# Patient Record
Sex: MALE | Race: WHITE | Employment: FULL TIME | URBAN - METROPOLITAN AREA
[De-identification: names, ages, dates, MRNs, and addresses within clinical notes are randomized per-mention and may not be internally consistent; named-entity substitution may affect disease eponyms.]

---

## 2020-11-19 ENCOUNTER — APPOINTMENT (OUTPATIENT)
Dept: GENERAL RADIOLOGY | Age: 30
End: 2020-11-19

## 2020-11-19 ENCOUNTER — HOSPITAL ENCOUNTER (EMERGENCY)
Age: 30
Discharge: HOME | End: 2020-11-19

## 2020-11-19 VITALS
HEIGHT: 71 IN | BODY MASS INDEX: 27.3 KG/M2 | DIASTOLIC BLOOD PRESSURE: 82 MMHG | OXYGEN SATURATION: 97 % | TEMPERATURE: 97.8 F | RESPIRATION RATE: 18 BRPM | HEART RATE: 82 BPM | WEIGHT: 195 LBS | SYSTOLIC BLOOD PRESSURE: 129 MMHG

## 2020-11-19 LAB
ALBUMIN SERPL-MCNC: 4.2 GM/DL (ref 3.4–5)
ALP BLD-CCNC: 71 IU/L (ref 40–129)
ALT SERPL-CCNC: 30 U/L (ref 10–40)
ANION GAP SERPL CALCULATED.3IONS-SCNC: 11 MMOL/L (ref 4–16)
AST SERPL-CCNC: 33 IU/L (ref 15–37)
BASOPHILS ABSOLUTE: 0.1 K/CU MM
BASOPHILS RELATIVE PERCENT: 0.7 % (ref 0–1)
BILIRUB SERPL-MCNC: 0.6 MG/DL (ref 0–1)
BUN BLDV-MCNC: 12 MG/DL (ref 6–23)
CALCIUM SERPL-MCNC: 8.9 MG/DL (ref 8.3–10.6)
CHLORIDE BLD-SCNC: 104 MMOL/L (ref 99–110)
CO2: 24 MMOL/L (ref 21–32)
CREAT SERPL-MCNC: 1.1 MG/DL (ref 0.9–1.3)
DIFFERENTIAL TYPE: ABNORMAL
EOSINOPHILS ABSOLUTE: 0.3 K/CU MM
EOSINOPHILS RELATIVE PERCENT: 2.9 % (ref 0–3)
GFR AFRICAN AMERICAN: >60 ML/MIN/1.73M2
GFR NON-AFRICAN AMERICAN: >60 ML/MIN/1.73M2
GLUCOSE BLD-MCNC: 78 MG/DL (ref 70–99)
HCT VFR BLD CALC: 45.3 % (ref 42–52)
HEMOGLOBIN: 15.1 GM/DL (ref 13.5–18)
IMMATURE NEUTROPHIL %: 0.7 % (ref 0–0.43)
LYMPHOCYTES ABSOLUTE: 2.2 K/CU MM
LYMPHOCYTES RELATIVE PERCENT: 22.5 % (ref 24–44)
MCH RBC QN AUTO: 30.6 PG (ref 27–31)
MCHC RBC AUTO-ENTMCNC: 33.3 % (ref 32–36)
MCV RBC AUTO: 91.7 FL (ref 78–100)
MONOCYTES ABSOLUTE: 0.7 K/CU MM
MONOCYTES RELATIVE PERCENT: 7.5 % (ref 0–4)
NUCLEATED RBC %: 0 %
PDW BLD-RTO: 13.2 % (ref 11.7–14.9)
PLATELET # BLD: 250 K/CU MM (ref 140–440)
PMV BLD AUTO: 9.5 FL (ref 7.5–11.1)
POTASSIUM SERPL-SCNC: 4.1 MMOL/L (ref 3.5–5.1)
RBC # BLD: 4.94 M/CU MM (ref 4.6–6.2)
SEGMENTED NEUTROPHILS ABSOLUTE COUNT: 6.3 K/CU MM
SEGMENTED NEUTROPHILS RELATIVE PERCENT: 65.7 % (ref 36–66)
SODIUM BLD-SCNC: 139 MMOL/L (ref 135–145)
TOTAL IMMATURE NEUTOROPHIL: 0.07 K/CU MM
TOTAL NUCLEATED RBC: 0 K/CU MM
TOTAL PROTEIN: 6.7 GM/DL (ref 6.4–8.2)
TROPONIN T: <0.01 NG/ML
TSH HIGH SENSITIVITY: 323.2 UIU/ML (ref 0.27–4.2)
WBC # BLD: 9.6 K/CU MM (ref 4–10.5)

## 2020-11-19 PROCEDURE — 93005 ELECTROCARDIOGRAM TRACING: CPT | Performed by: PHYSICIAN ASSISTANT

## 2020-11-19 PROCEDURE — 99284 EMERGENCY DEPT VISIT MOD MDM: CPT

## 2020-11-19 PROCEDURE — 85025 COMPLETE CBC W/AUTO DIFF WBC: CPT

## 2020-11-19 PROCEDURE — 71045 X-RAY EXAM CHEST 1 VIEW: CPT

## 2020-11-19 PROCEDURE — U0002 COVID-19 LAB TEST NON-CDC: HCPCS

## 2020-11-19 PROCEDURE — 84484 ASSAY OF TROPONIN QUANT: CPT

## 2020-11-19 PROCEDURE — 84443 ASSAY THYROID STIM HORMONE: CPT

## 2020-11-19 PROCEDURE — 93010 ELECTROCARDIOGRAM REPORT: CPT | Performed by: INTERNAL MEDICINE

## 2020-11-19 PROCEDURE — 80053 COMPREHEN METABOLIC PANEL: CPT

## 2020-11-19 RX ORDER — LEVOTHYROXINE SODIUM 0.07 MG/1
75 TABLET ORAL DAILY
Qty: 90 TABLET | Refills: 0 | Status: SHIPPED | OUTPATIENT
Start: 2020-11-19

## 2020-11-19 ASSESSMENT — PAIN SCALES - GENERAL: PAINLEVEL_OUTOF10: 7

## 2020-11-19 NOTE — ED PROVIDER NOTES
Twelve-lead EKG interpreted by me in the absence of a cardiologist.  There is no criteria ST elevation or reciprocal change. There are no hyperacute T wave changes. There is no sign of acute ischemia or infarction. There is an RSR' pattern in V1 and V2 and an S wave in lead I consistent with an incomplete right bundle branch block. There is appropriate discordance. This tracing shows a normal sinus rhythm. Rate and intervals are 69 beats per minute, WA interval 192 milliseconds, QRS duration 104 milliseconds, QTc interval 422 milliseconds, and R axis normal at -1 degrees. There is no acute change compared with the most recent EKG dated April 11, 2013 with a more pronounced bundle pattern today.         Maile Bagley MD  11/19/20 0983

## 2020-11-19 NOTE — ED PROVIDER NOTES
Patient Identification  Laina Ortiz is a 31 y.o. male    Chief Complaint  Concern For COVID-19; Fatigue; Fever; and Diarrhea      HPI  (History provided by patient)  This is a 31 y.o. male who was brought in by self for chief complaint of fatigue, subjective fever, diarrhea, chest pain. Patient notes that he has been having increasing fatigue for the last 3 weeks. No clear source. Has been sleeping well. No sick contacts. States over the last 12 hours he has developed pain in his left anterior chest intermittently, general body aches, worsening fatigue, several episodes of nonbloody diarrhea, subjective fever, occasional cough. He is concerned he may have Covid. He does not follow with any primary care provider. No known history of hypertension, hyperlipidemia, diabetes, coronary artery disease. No family history of early onset coronary artery disease. Denies drug use. Current smoker. REVIEW OF SYSTEMS    Constitutional:  + subjective fever, chills  HENT:  Denies ear pain.  + ST  Eyes: Denies vision changes, eye pain  Cardiovascular:  Denies syncope.  + CP  Respiratory:  Denies shortness of breath. + cough   GI:  Denies abdominal pain, nausea, vomiting  :  Denies dysuria, discharge  Musculoskeletal:  Denies back pain, joint pain  Skin:  Denies rash, pruritis  Neurologic:  Denies focal weakness, or sensory changes. + GILLESPIE    See HPI and nursing notes for additional information     I have reviewed the following nursing documentation:  Allergies: No Known Allergies    Past medical history:  has a past medical history of Spina bifida ([facility] [address] Past surgical history:  has no past surgical history on file. Home medications:   Prior to Admission medications    Medication Sig Start Date End Date Taking?  Authorizing Provider   levothyroxine (SYNTHROID) 75 MCG tablet Take 1 tablet by mouth daily 11/19/20  Yes Yoni Roach PA-C   ibuprofen (IBU) 800 MG tablet Take 1 tablet by mouth every 6 hours as needed for Pain 5/29/18   Cookie Mccormick PA-C   naproxen (NAPROSYN) 500 MG tablet Take 1 tablet by mouth 2 times daily 4/23/17   Camille Weiner PA-C   lidocaine viscous (XYLOCAINE) 2 % solution Put viscous lidocaine in specimen cup and soak cotton balls. Patient to apply cotton ball to affected region every 3-4  hours as needed when necessary pain 4/23/17   David Dozier PA-C       Social history:  reports that he has been smoking cigarettes. He has been smoking about 0.50 packs per day. He does not have any smokeless tobacco history on file. He reports current alcohol use. He reports that he does not use drugs. Family history:  History reviewed. No pertinent family history. Exam  /82   Pulse 82   Temp 97.8 °F (36.6 °C) (Oral)   Resp 18   Ht 5' 11\" (1.803 m)   Wt 195 lb (88.5 kg)   SpO2 97%   BMI 27.20 kg/m²   Nursing note and vitals reviewed. Constitutional: Well developed, well nourished. No acute distress. HENT:      Head: Normocephalic and atraumatic. Ears: External ears normal.      Nose: Nose normal.     Mouth: Membrane mucosa moist and pink. No posterior oropharynx erythema or tonsillar edema  Eyes: Anicteric sclera. No discharge, PERRL  Neck: Supple. Trachea midline. Cardiovascular: RRR, no murmurs, rubs, or gallops, radial pulses 2+ bilaterally. Pulmonary/Chest: Effort normal. No respiratory distress. CTAB. No stridor. No wheezes. No rales. Abdominal: Soft. Nontender to palpation. No distension. No guarding, rebound tenderness, or evidence of ascites. : No CVA tenderness. Musculoskeletal: Moves all extremities. No gross deformity. Neurological: Alert and oriented to person, place, and time. Normal muscle tone. Skin: Warm and dry. No rash. Psychiatric: Normal mood and affect. Behavior is normal.      EKG   Please see Dr. Cristiano Siu note for EKG read.       Radiographs (if obtained):  [] The following radiograph was interpreted by P-R Interval 192 ms    QRS Duration 104 ms    Q-T Interval 394 ms    QTc Calculation (Bazett) 422 ms    P Axis 55 degrees    R Axis -1 degrees    T Axis 23 degrees    Diagnosis       Normal sinus rhythm  Incomplete right bundle branch block  Borderline ECG  No previous ECGs available           MDM  Patient presents for fatigue, subjective fevers, diarrhea, concern for Covid. Vital signs unremarkable here. Physical exam is benign. He did report some chest discomfort in addition to his other symptoms, chest x-ray is negative, EKG is unremarkable, laboratory testing concerning for extremely elevated TSH. Patient does note that he was diagnosed with hypothyroidism as a child and was on medication through his pediatrician but has not seen any medical provider since that time, states he \"sort of forgot\" that he had hypothyroidism. His mother confirmed that his previous dose of 75 mcg of levothyroxine. Plan is to restart him on this medication and referred to primary care on call. Discussed with patient he needs to stay home until Covid test is returned. I did don/doff appropriate PPE, as recommended by the health facility/national standard best practice, during my bedside interactions with the patient. The patients risk of COVID19 warrants testing, but the patents risk of a bad outcome at this time is lower at home than staying in the ED or hospital to await the results. I have placed the placed the patient into a tracking system with contact information. I have given the patient precautions for return, as well as education and resources on COVID19. I have independently evaluated this patient. Final Impression  1. Other fatigue    2. Hypothyroidism, unspecified type        Blood pressure 129/82, pulse 82, temperature 97.8 °F (36.6 °C), temperature source Oral, resp. rate 18, height 5' 11\" (1.803 m), weight 195 lb (88.5 kg), SpO2 97 %. Disposition:  Discharge to home in stable condition. Patient was given scripts for the following medications. I counseled patient how to take these medications. Discharge Medication List as of 11/19/2020  9:46 AM      START taking these medications    Details   levothyroxine (SYNTHROID) 75 MCG tablet Take 1 tablet by mouth daily, Disp-90 tablet,R-0Print             This chart was generated using the Dragon dictation system. I created this record but it may contain dictation errors given the limitations of this technology.        Darshana Wolfe PA-C  11/19/20 5220

## 2020-11-19 NOTE — ED NOTES
Pt presents to ED c/o fatigue, diarrhea, and a fever for the past week. States he called into work and they want him to get tested for covid. Pt is alert and oriented, rates pain 7/10.      Batsheva Diego RN  11/19/20 0040

## 2020-11-20 ENCOUNTER — CARE COORDINATION (OUTPATIENT)
Dept: CARE COORDINATION | Age: 30
End: 2020-11-20

## 2020-11-20 LAB
SARS-COV-2: NOT DETECTED
SOURCE: NORMAL

## 2020-11-20 NOTE — CARE COORDINATION
Patient contacted regarding [facility]. Discussed COVID-19 related testing which was pending at this time. Test results were pending. Patient informed of results, if available? No    Care Transition Nurse/ Ambulatory Care Manager contacted the patient by telephone to perform post discharge assessment. Call within 2 business days of discharge: Yes. Verified name and  with patient as identifiers. Provided introduction to self, and explanation of the CTN/ACM role, and reason for call due to risk factors for infection and/or exposure to COVID-19. Symptoms reviewed with patient who verbalized the following symptoms: no new symptoms and no worsening symptoms. Due to no new or worsening symptoms encounter was not routed to provider for escalation. Discussed follow-up appointments. If no appointment was previously scheduled, appointment scheduling offered: Yes  [facility] follow up appointment(s): No future appointments. Non-[facility] follow up appointment(s):     Non-face-to-face services provided:  Obtained and reviewed discharge summary and/or continuity of care documents  Assistance in accessing community resources-[facility] in care clinic info     Advance Care Planning:   Does patient have an Advance Directive:  decision maker updated. Patient has following risk factors of: no known risk factors. CTN/ACM reviewed discharge instructions, medical action plan and red flags such as increased shortness of breath, increasing fever and signs of decompensation with patient who verbalized understanding. Discussed exposure protocols and quarantine with CDC Guidelines What to do if you are sick with coronavirus disease .  Patient was given an opportunity for questions and concerns. The patient agrees to contact the [facility] exposure line [phone], local health department  and PCP office for questions related to their healthcare. CTN/ACM provided contact information for future needs.     Reviewed and educated patient on any new and changed medications related to discharge diagnosis     Patient/family/caregiver given information for GetWell Loop and agrees to enroll yes  Patient's preferred e-mail:    Patient's preferred phone number:   Based on Loop alert triggers, patient will be contacted by nurse care manager for worsening symptoms. Pt will be further monitored by COVID Loop Team based on severity of symptoms and risk factors. [email or web address]

## 2020-11-24 ENCOUNTER — CARE COORDINATION (OUTPATIENT)
Dept: CARE COORDINATION | Age: 30
End: 2020-11-24

## 2020-11-24 LAB
EKG ATRIAL RATE: 69 BPM
EKG DIAGNOSIS: NORMAL
EKG P AXIS: 55 DEGREES
EKG P-R INTERVAL: 192 MS
EKG Q-T INTERVAL: 394 MS
EKG QRS DURATION: 104 MS
EKG QTC CALCULATION (BAZETT): 422 MS
EKG R AXIS: -1 DEGREES
EKG T AXIS: 23 DEGREES
EKG VENTRICULAR RATE: 69 BPM

## 2021-08-05 PROCEDURE — 99283 EMERGENCY DEPT VISIT LOW MDM: CPT

## 2021-08-05 ASSESSMENT — PAIN DESCRIPTION - LOCATION: LOCATION: NECK

## 2021-08-05 ASSESSMENT — PAIN SCALES - GENERAL: PAINLEVEL_OUTOF10: 6

## 2021-08-06 ENCOUNTER — HOSPITAL ENCOUNTER (INPATIENT)
Age: 31
LOS: 1 days | Discharge: LEFT BEFORE DISCHARGE | DRG: 103 | End: 2021-08-06
Attending: STUDENT IN AN ORGANIZED HEALTH CARE EDUCATION/TRAINING PROGRAM | Admitting: STUDENT IN AN ORGANIZED HEALTH CARE EDUCATION/TRAINING PROGRAM

## 2021-08-06 ENCOUNTER — APPOINTMENT (OUTPATIENT)
Dept: CT IMAGING | Age: 31
DRG: 103 | End: 2021-08-06

## 2021-08-06 ENCOUNTER — APPOINTMENT (OUTPATIENT)
Dept: GENERAL RADIOLOGY | Age: 31
DRG: 103 | End: 2021-08-06

## 2021-08-06 VITALS
BODY MASS INDEX: 27.72 KG/M2 | WEIGHT: 198 LBS | SYSTOLIC BLOOD PRESSURE: 112 MMHG | DIASTOLIC BLOOD PRESSURE: 68 MMHG | TEMPERATURE: 97.4 F | HEART RATE: 80 BPM | RESPIRATION RATE: 18 BRPM | HEIGHT: 71 IN | OXYGEN SATURATION: 97 %

## 2021-08-06 DIAGNOSIS — R29.1 NUCHAL RIGIDITY: ICD-10-CM

## 2021-08-06 DIAGNOSIS — N34.2 URETHRITIS: ICD-10-CM

## 2021-08-06 DIAGNOSIS — M25.50 POLYARTHRALGIA: Primary | ICD-10-CM

## 2021-08-06 PROBLEM — R51.9 HEADACHE: Status: ACTIVE | Noted: 2021-08-06

## 2021-08-06 LAB
ALBUMIN SERPL-MCNC: 4.3 GM/DL (ref 3.4–5)
ALP BLD-CCNC: 90 IU/L (ref 40–128)
ALT SERPL-CCNC: 43 U/L (ref 10–40)
ANION GAP SERPL CALCULATED.3IONS-SCNC: 10 MMOL/L (ref 4–16)
AST SERPL-CCNC: 46 IU/L (ref 15–37)
BACTERIA: NEGATIVE /HPF
BASE EXCESS MIXED: 3.2 (ref 0–1.2)
BASOPHILS ABSOLUTE: 0.1 K/CU MM
BASOPHILS RELATIVE PERCENT: 0.8 % (ref 0–1)
BILIRUB SERPL-MCNC: 0.8 MG/DL (ref 0–1)
BILIRUBIN URINE: NEGATIVE MG/DL
BLOOD, URINE: NEGATIVE
BUN BLDV-MCNC: 10 MG/DL (ref 6–23)
CALCIUM SERPL-MCNC: 9 MG/DL (ref 8.3–10.6)
CHLORIDE BLD-SCNC: 102 MMOL/L (ref 99–110)
CLARITY: CLEAR
CO2: 26 MMOL/L (ref 21–32)
COLOR: YELLOW
COMMENT: ABNORMAL
CREAT SERPL-MCNC: 1.2 MG/DL (ref 0.9–1.3)
CULTURE: NORMAL
DIFFERENTIAL TYPE: ABNORMAL
EKG ATRIAL RATE: 95 BPM
EKG DIAGNOSIS: NORMAL
EKG P AXIS: 60 DEGREES
EKG P-R INTERVAL: 164 MS
EKG Q-T INTERVAL: 352 MS
EKG QRS DURATION: 102 MS
EKG QTC CALCULATION (BAZETT): 442 MS
EKG R AXIS: -24 DEGREES
EKG T AXIS: 29 DEGREES
EKG VENTRICULAR RATE: 95 BPM
EOSINOPHILS ABSOLUTE: 0.2 K/CU MM
EOSINOPHILS RELATIVE PERCENT: 2.1 % (ref 0–3)
GFR AFRICAN AMERICAN: >60 ML/MIN/1.73M2
GFR NON-AFRICAN AMERICAN: >60 ML/MIN/1.73M2
GLUCOSE BLD-MCNC: 88 MG/DL (ref 70–99)
GLUCOSE, CSF: 56 MG/DL (ref 40–75)
GLUCOSE, URINE: NEGATIVE MG/DL
HCO3 VENOUS: 26.9 MMOL/L (ref 19–25)
HCT VFR BLD CALC: 45 % (ref 42–52)
HEMOGLOBIN: 14.7 GM/DL (ref 13.5–18)
HEPATITIS C ANTIBODY: NON REACTIVE
HIV SCREEN: NON REACTIVE
IMMATURE NEUTROPHIL %: 0.4 % (ref 0–0.43)
KETONES, URINE: NEGATIVE MG/DL
LACTATE: 1.2 MMOL/L (ref 0.4–2)
LEUKOCYTE ESTERASE, URINE: ABNORMAL
LYMPHOCYTES ABSOLUTE: 1.6 K/CU MM
LYMPHOCYTES RELATIVE PERCENT: 14.3 % (ref 24–44)
Lab: NORMAL
MCH RBC QN AUTO: 29.7 PG (ref 27–31)
MCHC RBC AUTO-ENTMCNC: 32.7 % (ref 32–36)
MCV RBC AUTO: 90.9 FL (ref 78–100)
MONOCYTES ABSOLUTE: 0.8 K/CU MM
MONOCYTES RELATIVE PERCENT: 6.6 % (ref 0–4)
MUCUS: ABNORMAL HPF
NITRITE URINE, QUANTITATIVE: NEGATIVE
NUCLEATED RBC %: 0 %
O2 SAT, VEN: 90.9 % (ref 50–70)
OTHER CELLS CSF: NORMAL CU MM
OTHER CELLS CSF: NORMAL CU MM
PCO2, VEN: 37 MMHG (ref 38–52)
PDW BLD-RTO: 13.1 % (ref 11.7–14.9)
PH VENOUS: 7.47 (ref 7.32–7.42)
PH, URINE: 6 (ref 5–8)
PLATELET # BLD: 169 K/CU MM (ref 140–440)
PMV BLD AUTO: 10.1 FL (ref 7.5–11.1)
PO2, VEN: 109 MMHG (ref 28–48)
POTASSIUM SERPL-SCNC: 4 MMOL/L (ref 3.5–5.1)
PRO-BNP: 95.17 PG/ML
PROTEIN CSF: 50 MG/DL (ref 15–45)
PROTEIN UA: NEGATIVE MG/DL
RBC # BLD: 4.95 M/CU MM (ref 4.6–6.2)
RBC CSF: 0 CU MM
RBC CSF: 0 CU MM
RBC URINE: 5 /HPF (ref 0–3)
SEGMENTED NEUTROPHILS ABSOLUTE COUNT: 8.6 K/CU MM
SEGMENTED NEUTROPHILS RELATIVE PERCENT: 75.8 % (ref 36–66)
SODIUM BLD-SCNC: 138 MMOL/L (ref 135–145)
SPECIFIC GRAVITY UA: 1.02 (ref 1–1.03)
SPECIMEN: NORMAL
SQUAMOUS EPITHELIAL: <1 /HPF
T4 FREE: 0.11 NG/DL (ref 0.9–1.8)
TOTAL IMMATURE NEUTOROPHIL: 0.05 K/CU MM
TOTAL NUCLEATED RBC: 0 K/CU MM
TOTAL PROTEIN: 6.6 GM/DL (ref 6.4–8.2)
TRICHOMONAS: ABNORMAL /HPF
TROPONIN T: <0.01 NG/ML
TSH HIGH SENSITIVITY: 328.9 UIU/ML (ref 0.27–4.2)
TUBE #: NORMAL
TUBE #: NORMAL
UROBILINOGEN, URINE: 2 MG/DL (ref 0.2–1)
WBC # BLD: 11.4 K/CU MM (ref 4–10.5)
WBC CSF: 0 CU MM (ref 0–5)
WBC CSF: 0 CU MM (ref 0–5)
WBC UA: 8 /HPF (ref 0–2)

## 2021-08-06 PROCEDURE — 85025 COMPLETE CBC W/AUTO DIFF WBC: CPT

## 2021-08-06 PROCEDURE — 2580000003 HC RX 258: Performed by: STUDENT IN AN ORGANIZED HEALTH CARE EDUCATION/TRAINING PROGRAM

## 2021-08-06 PROCEDURE — 84443 ASSAY THYROID STIM HORMONE: CPT

## 2021-08-06 PROCEDURE — 81001 URINALYSIS AUTO W/SCOPE: CPT

## 2021-08-06 PROCEDURE — 84484 ASSAY OF TROPONIN QUANT: CPT

## 2021-08-06 PROCEDURE — 70450 CT HEAD/BRAIN W/O DYE: CPT

## 2021-08-06 PROCEDURE — 84439 ASSAY OF FREE THYROXINE: CPT

## 2021-08-06 PROCEDURE — 93010 ELECTROCARDIOGRAM REPORT: CPT | Performed by: INTERNAL MEDICINE

## 2021-08-06 PROCEDURE — 87040 BLOOD CULTURE FOR BACTERIA: CPT

## 2021-08-06 PROCEDURE — 1200000000 HC SEMI PRIVATE

## 2021-08-06 PROCEDURE — 82805 BLOOD GASES W/O2 SATURATION: CPT

## 2021-08-06 PROCEDURE — 89051 BODY FLUID CELL COUNT: CPT

## 2021-08-06 PROCEDURE — 6360000002 HC RX W HCPCS: Performed by: STUDENT IN AN ORGANIZED HEALTH CARE EDUCATION/TRAINING PROGRAM

## 2021-08-06 PROCEDURE — 87591 N.GONORRHOEAE DNA AMP PROB: CPT

## 2021-08-06 PROCEDURE — 86803 HEPATITIS C AB TEST: CPT

## 2021-08-06 PROCEDURE — 009U3ZX DRAINAGE OF SPINAL CANAL, PERCUTANEOUS APPROACH, DIAGNOSTIC: ICD-10-PCS | Performed by: STUDENT IN AN ORGANIZED HEALTH CARE EDUCATION/TRAINING PROGRAM

## 2021-08-06 PROCEDURE — 62270 DX LMBR SPI PNXR: CPT

## 2021-08-06 PROCEDURE — 36415 COLL VENOUS BLD VENIPUNCTURE: CPT

## 2021-08-06 PROCEDURE — 82945 GLUCOSE OTHER FLUID: CPT

## 2021-08-06 PROCEDURE — 84157 ASSAY OF PROTEIN OTHER: CPT

## 2021-08-06 PROCEDURE — 87070 CULTURE OTHR SPECIMN AEROBIC: CPT

## 2021-08-06 PROCEDURE — 87205 SMEAR GRAM STAIN: CPT

## 2021-08-06 PROCEDURE — 93005 ELECTROCARDIOGRAM TRACING: CPT | Performed by: STUDENT IN AN ORGANIZED HEALTH CARE EDUCATION/TRAINING PROGRAM

## 2021-08-06 PROCEDURE — 86592 SYPHILIS TEST NON-TREP QUAL: CPT

## 2021-08-06 PROCEDURE — 80053 COMPREHEN METABOLIC PANEL: CPT

## 2021-08-06 PROCEDURE — 83880 ASSAY OF NATRIURETIC PEPTIDE: CPT

## 2021-08-06 PROCEDURE — 83605 ASSAY OF LACTIC ACID: CPT

## 2021-08-06 PROCEDURE — 87491 CHLMYD TRACH DNA AMP PROBE: CPT

## 2021-08-06 PROCEDURE — 87389 HIV-1 AG W/HIV-1&-2 AB AG IA: CPT

## 2021-08-06 PROCEDURE — 71045 X-RAY EXAM CHEST 1 VIEW: CPT

## 2021-08-06 RX ORDER — SODIUM CHLORIDE 0.9 % (FLUSH) 0.9 %
5-40 SYRINGE (ML) INJECTION PRN
Status: DISCONTINUED | OUTPATIENT
Start: 2021-08-06 | End: 2021-08-06 | Stop reason: HOSPADM

## 2021-08-06 RX ORDER — SODIUM CHLORIDE 0.9 % (FLUSH) 0.9 %
5-40 SYRINGE (ML) INJECTION EVERY 12 HOURS SCHEDULED
Status: DISCONTINUED | OUTPATIENT
Start: 2021-08-06 | End: 2021-08-06 | Stop reason: HOSPADM

## 2021-08-06 RX ORDER — 0.9 % SODIUM CHLORIDE 0.9 %
1000 INTRAVENOUS SOLUTION INTRAVENOUS ONCE
Status: COMPLETED | OUTPATIENT
Start: 2021-08-06 | End: 2021-08-06

## 2021-08-06 RX ORDER — VANCOMYCIN 1.75 G/350ML
1250 INJECTION, SOLUTION INTRAVENOUS EVERY 12 HOURS
Status: DISCONTINUED | OUTPATIENT
Start: 2021-08-06 | End: 2021-08-06 | Stop reason: HOSPADM

## 2021-08-06 RX ORDER — ACETAMINOPHEN 325 MG/1
650 TABLET ORAL EVERY 6 HOURS PRN
Status: DISCONTINUED | OUTPATIENT
Start: 2021-08-06 | End: 2021-08-06 | Stop reason: HOSPADM

## 2021-08-06 RX ORDER — ONDANSETRON 2 MG/ML
4 INJECTION INTRAMUSCULAR; INTRAVENOUS EVERY 6 HOURS PRN
Status: DISCONTINUED | OUTPATIENT
Start: 2021-08-06 | End: 2021-08-06 | Stop reason: HOSPADM

## 2021-08-06 RX ORDER — VANCOMYCIN 2 G/400ML
2000 INJECTION, SOLUTION INTRAVENOUS ONCE
Status: COMPLETED | OUTPATIENT
Start: 2021-08-06 | End: 2021-08-06

## 2021-08-06 RX ORDER — ONDANSETRON 4 MG/1
4 TABLET, ORALLY DISINTEGRATING ORAL EVERY 8 HOURS PRN
Status: DISCONTINUED | OUTPATIENT
Start: 2021-08-06 | End: 2021-08-06 | Stop reason: HOSPADM

## 2021-08-06 RX ORDER — POLYETHYLENE GLYCOL 3350 17 G/17G
17 POWDER, FOR SOLUTION ORAL DAILY PRN
Status: DISCONTINUED | OUTPATIENT
Start: 2021-08-06 | End: 2021-08-06 | Stop reason: HOSPADM

## 2021-08-06 RX ORDER — SODIUM CHLORIDE 9 MG/ML
25 INJECTION, SOLUTION INTRAVENOUS PRN
Status: DISCONTINUED | OUTPATIENT
Start: 2021-08-06 | End: 2021-08-06 | Stop reason: HOSPADM

## 2021-08-06 RX ORDER — ACETAMINOPHEN 650 MG/1
650 SUPPOSITORY RECTAL EVERY 6 HOURS PRN
Status: DISCONTINUED | OUTPATIENT
Start: 2021-08-06 | End: 2021-08-06 | Stop reason: HOSPADM

## 2021-08-06 RX ORDER — KETOROLAC TROMETHAMINE 30 MG/ML
30 INJECTION, SOLUTION INTRAMUSCULAR; INTRAVENOUS EVERY 6 HOURS PRN
Status: DISCONTINUED | OUTPATIENT
Start: 2021-08-06 | End: 2021-08-06 | Stop reason: HOSPADM

## 2021-08-06 RX ADMIN — CEFTRIAXONE 1000 MG: 1 INJECTION, POWDER, FOR SOLUTION INTRAMUSCULAR; INTRAVENOUS at 04:46

## 2021-08-06 RX ADMIN — SODIUM CHLORIDE, PRESERVATIVE FREE 10 ML: 5 INJECTION INTRAVENOUS at 09:32

## 2021-08-06 RX ADMIN — CEFTRIAXONE 1000 MG: 1 INJECTION, POWDER, FOR SOLUTION INTRAMUSCULAR; INTRAVENOUS at 07:58

## 2021-08-06 RX ADMIN — ACYCLOVIR SODIUM 900 MG: 50 INJECTION, SOLUTION INTRAVENOUS at 14:51

## 2021-08-06 RX ADMIN — KETOROLAC TROMETHAMINE 30 MG: 30 INJECTION, SOLUTION INTRAMUSCULAR; INTRAVENOUS at 12:34

## 2021-08-06 RX ADMIN — VANCOMYCIN 2000 MG: 2 INJECTION, SOLUTION INTRAVENOUS at 08:37

## 2021-08-06 RX ADMIN — ACYCLOVIR SODIUM 900 MG: 1000 INJECTION, SOLUTION INTRAVENOUS at 05:50

## 2021-08-06 RX ADMIN — SODIUM CHLORIDE 1000 ML: 9 INJECTION, SOLUTION INTRAVENOUS at 02:49

## 2021-08-06 RX ADMIN — CEFTRIAXONE SODIUM 2000 MG: 2 INJECTION, POWDER, FOR SOLUTION INTRAMUSCULAR; INTRAVENOUS at 16:36

## 2021-08-06 ASSESSMENT — ENCOUNTER SYMPTOMS
COUGH: 0
ABDOMINAL PAIN: 0
VOMITING: 0
NAUSEA: 0
CHEST TIGHTNESS: 0
SHORTNESS OF BREATH: 0
RHINORRHEA: 0
DIARRHEA: 0
SORE THROAT: 0
COLOR CHANGE: 0
WHEEZING: 0

## 2021-08-06 ASSESSMENT — PAIN DESCRIPTION - ORIENTATION
ORIENTATION: RIGHT

## 2021-08-06 ASSESSMENT — PAIN SCALES - GENERAL
PAINLEVEL_OUTOF10: 8
PAINLEVEL_OUTOF10: 8
PAINLEVEL_OUTOF10: 0
PAINLEVEL_OUTOF10: 2
PAINLEVEL_OUTOF10: 4

## 2021-08-06 ASSESSMENT — PAIN DESCRIPTION - ONSET
ONSET: ON-GOING

## 2021-08-06 ASSESSMENT — PAIN DESCRIPTION - PAIN TYPE
TYPE: ACUTE PAIN

## 2021-08-06 ASSESSMENT — PAIN DESCRIPTION - LOCATION
LOCATION: HIP;LEG

## 2021-08-06 ASSESSMENT — PAIN DESCRIPTION - PROGRESSION: CLINICAL_PROGRESSION: GRADUALLY IMPROVING

## 2021-08-06 ASSESSMENT — PAIN DESCRIPTION - FREQUENCY
FREQUENCY: INTERMITTENT
FREQUENCY: CONTINUOUS
FREQUENCY: CONTINUOUS

## 2021-08-06 ASSESSMENT — PAIN DESCRIPTION - DESCRIPTORS
DESCRIPTORS: CONSTANT;ACHING
DESCRIPTORS: ACHING
DESCRIPTORS: ACHING

## 2021-08-06 ASSESSMENT — PAIN - FUNCTIONAL ASSESSMENT: PAIN_FUNCTIONAL_ASSESSMENT: ACTIVITIES ARE NOT PREVENTED

## 2021-08-06 NOTE — CONSULTS
Parks Goodpasture MD.  Section of General Neurology - Adult  Consult Note        Reason for Consult:    Requesting Physician:  No referring provider defined for this encounter. Thank you for your kind referral.    CHIEF COMPLAINT:  Headache neck stiffness fever         HISTORY OF PRESENT ILLNESS:              The patient is a 31 y.o. male with a history of male with a reviewed and noncontributory family history and a PMH as stated above, who presents with complaints of fevers, headaches, lightheadedness, polyarthralgias, and myalgias over the last 5 days. Patient states today has been the worst day. States this has been \"pounding and did not feel right. \" States lights and especially sound bothers his headache more. Notes neck stiffness especially when bending down he stated his \"head felt like it was tightening up and for pressure. \" Patient endorses polyarthralgias with his right hip joint being the worst. He states that every major joint hurts and has been worse the last couple of days. Patient denies any visual changes or urinary incontinence. Does endorse an episode of urethritis approximately 2 weeks ago for which he took a random antibiotic that started with an \"A\" until it cleared up. States it was about 5 days of antibiotics. He said he looked it up online and decided to treat himself and would not take anything that harmed himself. Patient does endorse recent exposure to standing green water with mosquitoes. Patient does endorse left-sided chest pain that does not radiate and lasted approximately 2 hours. States it is a pressure like sensation that is a 6-7/10 on the pain scale. Nothing made it better but thinks his recent stress at work makes it worse. Denies any history of MIs, early or sudden cardiac death in the family, or any history of CVAs or diabetes mellitus. Otherwise patient has no additional complaints. pt denies any headache or neck stiffness. pt was started on antibiotics and acyclovir. pt s CSF was neg. CT brain was neg. Past Medical History:        Diagnosis Date    Adult hypothyroidism     Spina bifida [facility])      Past Surgical History:    History reviewed. No pertinent surgical history. Current Medications:   Current Facility-Administered Medications: lidocaine 1 % injection 5 mL, 5 mL, Intradermal, Once  sodium chloride flush 0.9 % injection 5-40 mL, 5-40 mL, Intravenous, 2 times per day  sodium chloride flush 0.9 % injection 5-40 mL, 5-40 mL, Intravenous, PRN  0.9 % sodium chloride infusion, 25 mL, Intravenous, PRN  ondansetron (ZOFRAN-ODT) disintegrating tablet 4 mg, 4 mg, Oral, Q8H PRN **OR** ondansetron (ZOFRAN) injection 4 mg, 4 mg, Intravenous, Q6H PRN  polyethylene glycol (GLYCOLAX) packet 17 g, 17 g, Oral, Daily PRN  acetaminophen (TYLENOL) tablet 650 mg, 650 mg, Oral, Q6H PRN **OR** acetaminophen (TYLENOL) suppository 650 mg, 650 mg, Rectal, Q6H PRN  cefTRIAXone (ROCEPHIN) 2000 mg IVPB in D5W 50ml minibag, 2,000 mg, Intravenous, Q12H  acyclovir (ZOVIRAX) 900 mg in dextrose 5 % 250 mL IVPB, 10 mg/kg, Intravenous, Q8H  ketorolac (TORADOL) injection 30 mg, 30 mg, Intravenous, Q6H PRN  vancomycin (VANCOCIN) 1250 mg in 250 mL IVPB, 1,250 mg, Intravenous, Q12H  Allergies:  Patient has no known allergies. Social History:  TOBACCO:   reports that he has been smoking cigarettes. He has been smoking about 0.50 packs per day. He has never used smokeless tobacco.  ETOH:   reports current alcohol use of about 14.0 standard drinks of alcohol per week. DRUGS:   reports no history of drug use.   Family History:       Problem Relation Age of Onset    Lupus Mother     No Known Problems Father        REVIEW OF SYSTEMS:  CONSTITUTIONAL:  negative  HEENT:  negative  RESPIRATORY:  negative  CARDIOVASCULAR:  negative  GASTROINTESTINAL:  negative  GENITOURINARY:  negative  MUSCULOSKELETAL:  negative  BEHAVIOR/PSYCH:  Negative    ROS neg    Family hx neg    PHYSICAL EXAM  ------------------------  Vitals:  BP 112/68   Pulse 80   Temp 97.4 °F (36.3 °C) (Oral)   Resp 18   Ht 5' 11\" (1.803 m)   Wt 198 lb (89.8 kg)   SpO2 97%   BMI 27.62 kg/m²      General:  Awake, alert, oriented X 3. Well developed, well nourished, well groomed. No apparent distress. HEENT:  Normocephalic, atraumatic. Pupils equal, round, reactive to light. No scleral icterus. No conjunctival injection. Normal lips, teeth, and gums. No nasal discharge. Neck:  Supple  Heart:  RRR, no murmurs, gallops, rubs  Lungs:  CTA bilaterally, bilat symmetrical expansion, no wheeze, rales, or rhonchi  Abdomen: Bowel sounds present, soft, nontender, no masses, no organomegaly, no peritoneal signs  Extremities:  No clubbing, cyanosis, or edema  Skin:  Warm and dry, no open lesions or rash  Breast: deferred  Rectal: deferred  Genitalia:  deferred    NEUROLOGICAL EXAM  ---------------------------------    Mental Status Exam:             Alert and oriented times three,follows commands,speech and language intact    Cranial [id number] Intact.         Cranial nerve II           Visual acuity:  normal                 Cranial nerve III           Pupils:  equal, round, reactive to light      Cranial nerves III, IV, VI           Extraocular Movements: intact      Cranial nerve V           Facial sensation:  intact      Cranial nerve VII           Facial strength: intact      Cranial nerve VIII           Hearing:  intact      Cranial nerve IX           Palate:  intact      Cranial nerve XI         Shoulder shrug:  intact      Cranial nerve XII          Tongue movement:  normal    Motor:    Drift:  absent  Motor exam is symmetrical 5 out of 5 all extremities bilaterally  Tone:  normal  Abnormal Movements:  Absent    DTRs-2+ biceps,triceps,brachioradialis,knee jerks and ankle jerks bilaterally symmetrical.  Toes-downgoing bilaterally            Sensory:normal sensation              CBC with Differential:    Lab Results   Component Value Date    WBC 11.4 08/06/2021 Toxicology:  No components found for: IAMMENTA, IBARBIT, IBENZO, ICOCAINE, IMARTHC, IOPIATES, IPHENCYC     IMPRESSION:    Fever meningismus-CSF neg for meningitis encephalitis    Cephalagia    Cervical strain    High  - per [facility]    PLAN:    Mri brain Mra head MRV    Mri C spine    LP neg results    Consult ID    Discussed dx prognosis and above with pt and answered all questions. Mckinley Sharif MD MD  BOARD CERTIFIED-NEUROLOGY.

## 2021-08-06 NOTE — ED NOTES
[facility] at this time.       Adilia Young RN  08/06/21 1129

## 2021-08-06 NOTE — ED PROVIDER NOTES
Twelve-lead EKG obtained at 0010 on 6 August 2021 and interpreted by me in the absence of a cardiologist.  There is no criteria ST elevation or reciprocal change. There are no hyperacute T wave changes. There is no sign of acute ischemia or infarction. There is an RSR' pattern in V1 and V2 and an S wave in lead I consistent with a an incomplete right bundle branch block. There is appropriate discordance. This tracing shows a normal sinus rhythm with nonspecific T wave changes. Rate and intervals are 95 beats per minute, MO interval 164 milliseconds, QRS duration 102 milliseconds, QTc interval 442 milliseconds, and R axis normal at -24 degrees. There is no acute change compared with the most recent EKG dated November 19, 2020 including similar block pattern.         Kt Disla MD  08/06/21 4025

## 2021-08-06 NOTE — CONSULTS
[id number] [facility]  consulted by Dr. Fortunato Stephen for monitoring and adjustment. Indication for treatment: Possible meningitis, possible STD  Goal trough: 15-20 mcg/mL  AUC Goal:  400-600    Pertinent Laboratory Values:   Temp Readings from Last 3 Encounters:   08/06/21 98.8 °F (37.1 °C) (Oral)   11/19/20 97.8 °F (36.6 °C) (Oral)   05/29/18 97.8 °F (36.6 °C) (Oral)     Recent Labs     08/06/21  0115   WBC 11.4*   LACTATE 1.2     Recent Labs     08/06/21  0115   BUN 10   CREATININE 1.2     Estimated Creatinine Clearance: 95 mL/min (based on SCr of 1.2 mg/dL). No intake or output data in the 24 hours ending 08/06/21 1292    Pertinent Cultures:  Date    Source    Results  8/6              Chlamydia, gonorrhea  Ordered  8/6              Blood    Ordered    Vancomycin level:   TROUGH:  No results for input(s): VANCOTROUGH in the last 72 hours. RANDOM:  No results for input(s): VANCORANDOM in the last 72 hours. Assessment:  WBC and temperature: WBC elevated @11.4, pt afebrile  SCr, BUN, and urine output: SCR appears slightly above baseline @1.2, no UOP data  Day(s) of therapy:  1  Vancomycin concentration: Trough scheduled for 8/7 @20:30    Plan:  The patient is to receive vancomycin 2000mg ivpb x1 dose this morning. Start vancomycin 1250mg ivpb q12h tonight @21:00  Due to SCR above baseline, will adjust vanco dose based on trough results for now  Check the trough before the 4th dose. Pharmacy will continue to monitor patient and adjust therapy as indicated    Felipa 3 8/7 @20:30    Thank you for the consult. Viola Mendoza, [facility]   8/6/2021 7:22 AM

## 2021-08-06 NOTE — H&P
Chief Complaint: Jovi Pulliam is a 31 y.o.  male with a reviewed and noncontributory family history and a PMH as stated above, who presents with complaints of fevers, headaches, lightheadedness, polyarthralgias, and myalgias over the last 5 days. Patient states today has been the worst day. States this has been \"pounding and did not feel right. \" States lights and especially sound bothers his headache more. Notes neck stiffness especially when bending down he stated his \"head felt like it was tightening up and for pressure. \" Patient endorses polyarthralgias with his right hip joint being the worst. He states that every major joint hurts and has been worse the last couple of days. Patient denies any visual changes or urinary incontinence. Does endorse an episode of urethritis approximately 2 weeks ago for which he took a random antibiotic that started with an \"A\" until it cleared up. States it was about 5 days of antibiotics. He said he looked it up online and decided to treat himself and would not take anything that harmed himself. Patient does endorse recent exposure to standing green water with mosquitoes. Patient does endorse left-sided chest pain that does not radiate and lasted approximately 2 hours. States it is a pressure like sensation that is a 6-7/10 on the pain scale. Nothing made it better but thinks his recent stress at work makes it worse. Denies any history of MIs, early or sudden cardiac death in the family, or any history of CVAs or diabetes mellitus. Otherwise patient has no additional complaints. Discussed case with ED provider. ROS:   Review of Systems   Constitutional: Positive for chills and fatigue. Negative for appetite change, diaphoresis and fever. HENT: Negative for congestion, rhinorrhea and sore throat. Eyes: Negative for visual disturbance. Respiratory: Negative for cough, chest tightness, shortness of breath and wheezing.     Cardiovascular: Positive for chest pain. Negative for palpitations and leg swelling. Gastrointestinal: Negative for abdominal pain, diarrhea, nausea and vomiting. Genitourinary: Positive for discharge. Negative for dysuria, frequency, hematuria and urgency. Musculoskeletal: Positive for neck pain and neck stiffness. Negative for arthralgias. Skin: Negative for color change and rash. Neurological: Positive for light-headedness and headaches. Negative for dizziness, seizures, facial asymmetry, weakness and numbness. Psychiatric/Behavioral: Negative for confusion. Objective:   No intake or output data in the 24 hours ending 08/06/21 0505   Vitals:   Vitals:    08/06/21 0002   BP: 111/78   Pulse: 94   Resp: 18   Temp: 98.8 °F (37.1 °C)   SpO2: 96%     /78   Pulse 94   Temp 98.8 °F (37.1 °C) (Oral)   Resp 18   Ht 5' 11\" (1.803 m)   Wt 198 lb (89.8 kg)   SpO2 96%   BMI 27.62 kg/m²   Physical Exam:   Physical Exam  Vitals and nursing note reviewed. Constitutional:       General: He is awake. He is not in acute distress. Appearance: Normal appearance. He is overweight. He is not ill-appearing, toxic-appearing or diaphoretic. Interventions: He is not intubated. HENT:      Head: Atraumatic. Right Ear: External ear normal.      Left Ear: External ear normal.      Nose: Nose normal. No rhinorrhea. Mouth/Throat:      Mouth: Mucous membranes are moist.      Tongue: Tongue does not deviate from midline. Pharynx: Uvula midline. Eyes:      General: No scleral icterus. Extraocular Movements: Extraocular movements intact. Conjunctiva/sclera: Conjunctivae normal.      Pupils: Pupils are equal, round, and reactive to light. Comments: Minimal photosensitivity with eye exam.   Neck:      Meningeal: Brudzinski's sign and Kernig's sign absent. Comments: Brudzinski's and Kernig negative. States improved after lumbar puncture.   Cardiovascular:      Rate and Rhythm: Normal rate and regular rhythm. Pulses: Normal pulses. Heart sounds: Normal heart sounds. No murmur heard. No gallop. Pulmonary:      Effort: Pulmonary effort is normal. No tachypnea or prolonged expiration. He is not intubated. Breath sounds: Normal breath sounds. No wheezing, rhonchi or rales. Abdominal:      General: Bowel sounds are normal. There is no distension. Palpations: Abdomen is soft. Tenderness: There is no abdominal tenderness. There is no guarding or rebound. Negative signs include Duke's sign and Rovsing's sign. Musculoskeletal:         General: Normal range of motion. Cervical back: Neck supple. Right lower leg: No edema. Left lower leg: No edema. Skin:     General: Skin is warm and dry. Capillary Refill: Capillary refill takes less than 2 seconds. Neurological:      General: No focal deficit present. Mental Status: He is alert and oriented to person, place, and time. Mental status is at baseline. Cranial Nerves: No cranial nerve deficit, dysarthria or facial asymmetry. Sensory: No sensory deficit. Motor: No weakness, tremor or seizure activity. Psychiatric:         Attention and Perception: He is attentive. Mood and Affect: Mood is not anxious. Speech: He is communicative. Speech is not slurred. Behavior: Behavior is cooperative. Past Medical History:      Past Medical History:   Diagnosis Date    Adult hypothyroidism     [facility]      PSHX:  has no past surgical history on file. Allergies: No Known Allergies    FAM HX: family history includes Lupus in his mother; No Known Problems in his father.   Soc HX:   Social History     Socioeconomic History    Marital status: Single     Spouse name: None    Number of children: None    Years of education: None    Highest education level: None   Occupational History    None   Tobacco Use    Smoking status: Current Every Day Smoker     Packs/day: 0.50 Types: Cigarettes    Smokeless tobacco: Never Used   Substance and Sexual Activity    Alcohol use: Yes     Alcohol/week: 14.0 standard drinks     Types: 14 Cans of beer per week    Drug use: No    Sexual activity: None   Other Topics Concern    None   Social History Narrative    None     Social Determinants of Health     Financial Resource Strain:     Difficulty of Paying Living Expenses:    Food Insecurity:     Worried About Running Out of Food in the Last Year:     Ran Out of Food in the Last Year:    Transportation Needs:     Lack of Transportation (Medical):      Lack of Transportation (Non-Medical):    Physical Activity:     Days of Exercise per Week:     Minutes of Exercise per Session:    Stress:     Feeling of Stress :    Social Connections:     Frequency of Communication with Friends and Family:     Frequency of Social Gatherings with Friends and Family:     Attends Pent[employer]al Services:     Active Member of Clubs or Organizations:     Attends Club or Organization Meetings:     Marital Status:    Intimate Partner Violence:     Fear of Current or Ex-Partner:     Emotionally Abused:     Physically Abused:     Sexually Abused:        Data:   CBC with Differential:    Lab Results   Component Value Date    WBC 11.4 08/06/2021    RBC 4.95 08/06/2021    HGB 14.7 08/06/2021    HCT 45.0 08/06/2021     08/06/2021    MCV 90.9 08/06/2021    MCH 29.7 08/06/2021    MCHC 32.7 08/06/2021    RDW 13.1 08/06/2021    SEGSPCT 75.8 08/06/2021    LYMPHOPCT 14.3 08/06/2021    MONOPCT 6.6 08/06/2021    BASOPCT 0.8 08/06/2021    MONOSABS 0.8 08/06/2021    LYMPHSABS 1.6 08/06/2021    EOSABS 0.2 08/06/2021    BASOSABS 0.1 08/06/2021    DIFFTYPE AUTOMATED DIFFERENTIAL 08/06/2021       CMP:     Lab Results   Component Value Date     08/06/2021    K 4.0 08/06/2021     08/06/2021    CO2 26 08/06/2021    BUN 10 08/06/2021    CREATININE 1.2 08/06/2021    GFRAA >60 08/06/2021    LABGLOM >60 08/06/2021    GLUCOSE 88 08/06/2021    PROT 6.6 08/06/2021    LABALBU 4.3 08/06/2021    CALCIUM 9.0 08/06/2021    BILITOT 0.8 08/06/2021    ALKPHOS 90 08/06/2021    AST 46 08/06/2021    ALT 43 08/06/2021       Troponin:  Lab Results   Component Value Date    TROPONINT <0.010 08/06/2021       U/A:    Lab Results   Component Value Date    COLORU YELLOW 08/06/2021    PROTEINU NEGATIVE 08/06/2021    WBCUA 8 08/06/2021    RBCUA 5 08/06/2021    MUCUS FEW 08/06/2021    TRICHOMONAS NONE SEEN 08/06/2021    BACTERIA NEGATIVE 08/06/2021    CLARITYU CLEAR 08/06/2021    SPECGRAV 1.025 08/06/2021    LEUKOCYTESUR TRACE 08/06/2021    UROBILINOGEN 2.0 08/06/2021    BILIRUBINUR NEGATIVE 08/06/2021    BLOODU NEGATIVE 08/06/2021     Radiology results:  XR CHEST PORTABLE   Final Result   No acute cardiopulmonary process         CT Head WO Contrast   Final Result   No acute intracranial abnormality. Medications:   Home Medications:   Prior to Admission medications    Medication Sig Start Date End Date Taking? Authorizing Provider   levothyroxine (SYNTHROID) 75 MCG tablet Take 1 tablet by mouth daily 11/19/20   Octavio Roach PA-C   ibuprofen (IBU) 800 MG tablet Take 1 tablet by mouth every 6 hours as needed for Pain 5/29/18   Bobby De Paz PA-C   naproxen (NAPROSYN) 500 MG tablet Take 1 tablet by mouth 2 times daily 4/23/17   Leopold Allis Wagenknecht, PA-C   lidocaine viscous (XYLOCAINE) 2 % solution Put viscous lidocaine in specimen cup and soak cotton balls.     Patient to apply cotton ball to affected region every 3-4  hours as needed when necessary pain 4/23/17   Lucia Gonsalez PA-C     Medications:    lidocaine  5 mL Intradermal Once    cefTRIAXone (ROCEPHIN) IV  1,000 mg Intravenous Once    acyclovir  10 mg/kg Intravenous Once    vancomycin  2,000 mg Intravenous Once    sodium chloride flush  5-40 mL Intravenous 2 times per day    cefTRIAXone (ROCEPHIN) IV  2,000 mg Intravenous Q12H    cefTRIAXone (ROCEPHIN) IV  1,000 mg Intravenous Once    acyclovir  10 mg/kg Intravenous Q8H      Infusions:    sodium chloride       PRN Meds: sodium chloride flush, 5-40 mL, PRN  sodium chloride, 25 mL, PRN  ondansetron, 4 mg, Q8H PRN   Or  ondansetron, 4 mg, Q6H PRN  polyethylene glycol, 17 g, Daily PRN  acetaminophen, 650 mg, Q6H PRN   Or  acetaminophen, 650 mg, Q6H PRN  ketorolac, 30 mg, Q6H PRN        Electronically signed by Cortez Croft DO on 8/6/2021 at 5:05 AM      This dictation was created with voice recognition software. While attempts have been made to review the dictation as it is transcribed, on occasion the spoken word can be misinterpreted by the technology leading to omissions or inappropriate words, phrases or sentences.

## 2021-08-06 NOTE — PROGRESS NOTES
Patient was admitted after midnight for suspected meningitis. S/p LP. He reports improvement in symptoms. No significant neck rigidity noted during my evaluation. Neurology consultation pending. Laboratory work up pending. Continue acyclovir and broad spectrum antibiotics. Will continue to follow.

## 2021-08-07 LAB — RPR: NON REACTIVE

## 2021-08-07 NOTE — DISCHARGE SUMMARY
Discharge Summary    Name:  Cecy Ya /Age/Sex: 1990  (31 y.o. male)   MRN & CSN:  [id number] & [id number] Admission Date/Time: 2021 12:37 AM   Attending:  No att. providers found Discharging Physician: Magi Cheney MD     Hospital Course:   Cecy Ya is a 31 y.o.  male  who presents with Headache    Hospital Course. Patient was being treated for suspected meningitis. Neurology was consulted. I found out this morning that patient had left AMA around 11 pm last night. I was not present at the time of DC ( [facility]    Consults this admission:  IP CONSULT TO HOSPITALIST  PHARMACY TO DOSE VANCOMYCIN  IP CONSULT TO [address] [place] [address]  IP CONSULT TO INFECTIOUS DISEASES    Discharge Instruction:   Follow up appointments:   Primary care physician:  within 2 weeks    Left AMA     Discharge Medications:      [facility] Medication Instructions TO:[id number]    Printed on:21 1130   Medication Information                      ibuprofen (IBU) 800 MG tablet  Take 1 tablet by mouth every 6 hours as needed for Pain             levothyroxine (SYNTHROID) 75 MCG tablet  Take 1 tablet by mouth daily                 Objective Findings at Discharge:   /68   Pulse 80   Temp 97.4 °F (36.3 °C) (Oral)   Resp 18   Ht 5' 11\" (1.803 m)   Wt 198 lb (89.8 kg)   SpO2 97%   BMI 27.62 kg/m²            PHYSICAL EXAM     Patient left AMA last night. I was not present at the times he left.        BMP/CBC  Recent Labs     21  0115      K 4.0      CO2 26   BUN 10   CREATININE 1.2   WBC 11.4*   HCT 45.0          IMAGIN minutes was spent preparing this DC summary     Electronically signed by Magi Cheney MD on 2021 at 11:30 AM

## 2021-08-09 ENCOUNTER — HOSPITAL ENCOUNTER (EMERGENCY)
Age: 31
Discharge: HOME | End: 2021-08-09
Attending: EMERGENCY MEDICINE

## 2021-08-09 VITALS
RESPIRATION RATE: 18 BRPM | SYSTOLIC BLOOD PRESSURE: 116 MMHG | TEMPERATURE: 98.4 F | HEART RATE: 95 BPM | DIASTOLIC BLOOD PRESSURE: 66 MMHG | OXYGEN SATURATION: 97 %

## 2021-08-09 DIAGNOSIS — R51.9 NONINTRACTABLE HEADACHE, UNSPECIFIED CHRONICITY PATTERN, UNSPECIFIED HEADACHE TYPE: Primary | ICD-10-CM

## 2021-08-09 LAB
CHLAMYDIA TRACHOMATIS AMPLIFIED DET: NEGATIVE
N GONORRHOEAE AMPLIFIED DET: NEGATIVE

## 2021-08-09 PROCEDURE — 6360000002 HC RX W HCPCS: Performed by: EMERGENCY MEDICINE

## 2021-08-09 PROCEDURE — 6370000000 HC RX 637 (ALT 250 FOR IP): Performed by: EMERGENCY MEDICINE

## 2021-08-09 PROCEDURE — 96375 TX/PRO/DX INJ NEW DRUG ADDON: CPT

## 2021-08-09 PROCEDURE — 96374 THER/PROPH/DIAG INJ IV PUSH: CPT

## 2021-08-09 PROCEDURE — 99285 EMERGENCY DEPT VISIT HI MDM: CPT

## 2021-08-09 RX ORDER — DIPHENHYDRAMINE HYDROCHLORIDE 50 MG/ML
25 INJECTION INTRAMUSCULAR; INTRAVENOUS ONCE
Status: COMPLETED | OUTPATIENT
Start: 2021-08-09 | End: 2021-08-09

## 2021-08-09 RX ORDER — DEXAMETHASONE SODIUM PHOSPHATE 10 MG/ML
10 INJECTION, SOLUTION INTRAMUSCULAR; INTRAVENOUS ONCE
Status: COMPLETED | OUTPATIENT
Start: 2021-08-09 | End: 2021-08-09

## 2021-08-09 RX ORDER — BUTALBITAL, ACETAMINOPHEN AND CAFFEINE 50; 325; 40 MG/1; MG/1; MG/1
1 TABLET ORAL EVERY 4 HOURS PRN
Qty: 30 TABLET | Refills: 0 | Status: SHIPPED | OUTPATIENT
Start: 2021-08-09 | End: 2021-08-14

## 2021-08-09 RX ORDER — METOCLOPRAMIDE HYDROCHLORIDE 5 MG/ML
10 INJECTION INTRAMUSCULAR; INTRAVENOUS ONCE
Status: COMPLETED | OUTPATIENT
Start: 2021-08-09 | End: 2021-08-09

## 2021-08-09 RX ORDER — CAFFEINE 200 MG
200 TABLET ORAL ONCE
Status: COMPLETED | OUTPATIENT
Start: 2021-08-09 | End: 2021-08-09

## 2021-08-09 RX ORDER — KETOROLAC TROMETHAMINE 30 MG/ML
15 INJECTION, SOLUTION INTRAMUSCULAR; INTRAVENOUS ONCE
Status: COMPLETED | OUTPATIENT
Start: 2021-08-09 | End: 2021-08-09

## 2021-08-09 RX ADMIN — METOCLOPRAMIDE 10 MG: 5 INJECTION, SOLUTION INTRAMUSCULAR; INTRAVENOUS at 19:09

## 2021-08-09 RX ADMIN — DEXAMETHASONE SODIUM PHOSPHATE 10 MG: 10 INJECTION, SOLUTION INTRAMUSCULAR; INTRAVENOUS at 19:09

## 2021-08-09 RX ADMIN — CAFFEINE 200 MG: 200 TABLET ORAL at 19:12

## 2021-08-09 RX ADMIN — DIPHENHYDRAMINE HYDROCHLORIDE 25 MG: 50 INJECTION INTRAMUSCULAR; INTRAVENOUS at 19:09

## 2021-08-09 RX ADMIN — KETOROLAC TROMETHAMINE 15 MG: 30 INJECTION, SOLUTION INTRAMUSCULAR at 19:08

## 2021-08-09 ASSESSMENT — PAIN DESCRIPTION - PAIN TYPE: TYPE: ACUTE PAIN

## 2021-08-09 ASSESSMENT — PAIN SCALES - GENERAL
PAINLEVEL_OUTOF10: 9
PAINLEVEL_OUTOF10: 9

## 2021-08-09 ASSESSMENT — PAIN DESCRIPTION - LOCATION: LOCATION: HEAD

## 2021-08-09 ASSESSMENT — PAIN DESCRIPTION - DESCRIPTORS: DESCRIPTORS: ACHING

## 2021-08-09 NOTE — ED NOTES
Report received from Sonia RN. All questions answered at this time.      Kareem Tobin RN  08/09/21 6447

## 2021-08-09 NOTE — ED PROVIDER NOTES
pallor and wound. Neurological: Positive for headaches. Negative for facial asymmetry, light-headedness and numbness. Psychiatric/Behavioral: Negative for confusion. Except as noted above the remainder of the review of systems was reviewed and negative. PAST MEDICAL HISTORY     Past Medical History:   Diagnosis Date    Adult hypothyroidism     Spina [facility]ida ([facility]        Prior to Admission medications    Medication Sig Start Date End Date Taking? Authorizing Provider   butalbital-acetaminophen-caffeine (FIORICET, ESGIC) -39 MG per tablet Take 1 tablet by mouth every 4 hours as needed for Headaches 8/9/21 8/14/21 Yes Marylene Schlatter, MD   levothyroxine (SYNTHROID) 75 MCG tablet Take 1 tablet by mouth daily 11/19/20   Royal Roach PA-C   ibuprofen (IBU) 800 MG tablet Take 1 tablet by mouth every 6 hours as needed for Pain 5/29/18   Giulia King PA-C        Patient Active Problem List   Diagnosis    Headache         SURGICAL HISTORY     History reviewed. No pertinent surgical history. CURRENT MEDICATIONS       Discharge Medication List as of 8/9/2021  9:10 PM      CONTINUE these medications which have NOT CHANGED    Details   levothyroxine (SYNTHROID) 75 MCG tablet Take 1 tablet by mouth daily, Disp-90 tablet,R-0Print      ibuprofen (IBU) 800 MG tablet Take 1 tablet by mouth every 6 hours as needed for Pain, Disp-20 tablet, R-0Print             ALLERGIES     Patient has no known allergies.     FAMILY HISTORY       Family History   Problem Relation Age of Onset    Lupus Mother     No Known Problems Father           SOCIAL HISTORY       Social History     Socioeconomic History    Marital status: Single     Spouse name: None    Number of children: None    Years of education: None    Highest education level: None   Occupational History    None   Tobacco Use    Smoking status: Current Every Day Smoker     Packs/day: 0.50     Types: Cigarettes    Smokeless tobacco: Never Used   Vaping Use    Vaping Use: Never used   Substance and Sexual Activity    Alcohol use: Yes     Alcohol/week: 14.0 standard drinks     Types: 14 Cans of beer per week    Drug use: No    Sexual activity: None   Other Topics Concern    None   Social History Narrative    None     Social Determinants of Health     Financial Resource Strain:     Difficulty of Paying Living Expenses:    Food Insecurity:     Worried About Running Out of Food in the Last Year:     Ran Out of Food in the Last Year:    Transportation Needs:     Lack of Transportation (Medical):  Lack of Transportation (Non-Medical):    Physical Activity:     Days of Exercise per Week:     Minutes of Exercise per Session:    Stress:     Feeling of Stress :    Social Connections:     Frequency of Communication with Friends and Family:     Frequency of Social Gatherings with Friends and Family:     Attends Buddhism Services:     Active Member of Clubs or Organizations:     Attends Club or Organization Meetings:     Marital Status:    Intimate Partner Violence:     Fear of Current or Ex-Partner:     Emotionally Abused:     Physically Abused:     Sexually Abused:        SCREENINGS    [place] Coma Scale  Eye Opening: Spontaneous  Best Verbal Response: Oriented  Best Motor Response: Obeys commands  Zana Coma Scale Score: 15          PHYSICAL EXAM    (up to 7 for level 4, 8 or more for level 5)     ED Triage Vitals [08/09/21 1445]   BP Temp Temp Source Pulse Resp SpO2 Height Weight   116/66 98.4 °F (36.9 °C) Oral 95 18 97 % -- --       Physical Exam  Vitals reviewed. Constitutional:       Appearance: He is not ill-appearing or toxic-appearing. HENT:      Head: Normocephalic and atraumatic. Nose: No congestion or rhinorrhea. Mouth/Throat:      Mouth: Mucous membranes are moist.      Pharynx: No oropharyngeal exudate or posterior oropharyngeal erythema. Eyes:      General:         Right eye: No discharge.          Left eye: No discharge. Extraocular Movements: Extraocular movements intact. Pupils: Pupils are equal, round, and reactive to light. Cardiovascular:      Rate and Rhythm: Normal rate. Heart sounds: No friction rub. No gallop. Pulmonary:      Effort: Pulmonary effort is normal. No respiratory distress. Chest:      Chest wall: No tenderness. Abdominal:      Palpations: Abdomen is soft. Tenderness: There is no abdominal tenderness. There is no guarding. Musculoskeletal:         General: No swelling or tenderness. Normal range of motion. Cervical back: Normal range of motion and neck supple. No tenderness. Right lower leg: No edema. Left lower leg: No edema. Lymphadenopathy:      Cervical: No cervical adenopathy. Skin:     General: Skin is warm. Capillary Refill: Capillary refill takes less than 2 seconds. Neurological:      General: No focal deficit present. Mental Status: He is alert and oriented to person, place, and time. Mental status is at baseline. DIAGNOSTIC RESULTS     Labs Reviewed - No data to display       RADIOLOGY:     Non-plain film images such as CT, Ultrasound and MRI are read by the radiologist. Plain radiographic images are visualized and preliminarily interpreted by the emergency physician. Interpretation per the Radiologist below, if available at the time of this note:    No orders to display         ED BEDSIDE ULTRASOUND:   Performed by ED Physician Toy Jose MD       LABS:  Labs Reviewed - No data to display    All other labs were within normal range or not returned as of this dictation.     EMERGENCY DEPARTMENT COURSE and DIFFERENTIAL DIAGNOSIS/MDM:   Vitals:    Vitals:    08/09/21 1445   BP: 116/66   Pulse: 95   Resp: 18   Temp: 98.4 °F (36.9 °C)   TempSrc: Oral   SpO2: 97%           MDM  Number of Diagnoses or Management Options  Nonintractable headache, unspecified chronicity pattern, unspecified headache type  Diagnosis management comments: 69-year-old male presents for headache. He was recently mated to the hospital for headache and neck pain and he underwent a lumbar puncture that was negative for infectious process. He had been on IV antibiotics were discontinued. He did sign himself out AMA from the recent hospitalization. Presents today reporting headache. No other remarkable symptoms. No infectious symptoms. No trauma or injury. GCS of 15. He is ambulatory without difficulty. Does not identify exacerbating remitting factors. Presents with unremarkable vitals. He is afebrile. Active saturations are in the high 90s on room air. Blood pressure within normal limits. Given his recent hospitalization with a negative work-up for meningitis, I do feel comfortable treating him symptomatically for headache. He is given a headache cocktail of IV Toradol, Reglan, fluids, diphenhydramine as well as oral caffeine. On reassessment her symptoms are improved. He will continue to treat his headache at home. I do feel the headache might be somewhat related to the recent LP might be due to a headache condition. He does have follow-up with a primary care physician soon. Return precautions for worsening symptoms are discussed. He is discharged home in stable condition.      -  Patient seen and evaluated in the emergency department. -  Triage and nursing notes reviewed and incorporated. -  Old chart records reviewed and incorporated. -  Work-up included:  See above  -  Results discussed with patient. CONSULTS:  None    PROCEDURES:  None performed unless otherwise noted below     Procedures        FINAL IMPRESSION      1.  Nonintractable headache, unspecified chronicity pattern, unspecified headache type          DISPOSITION/PLAN   DISPOSITION Decision To Discharge 08/09/2021 08:59:47 PM      PATIENT REFERRED TO:  [address]  [facility] [id number]  Schedule an appointment as soon as possible for a visit in 2 days        DISCHARGE MEDICATIONS:  Discharge Medication List as of 8/9/2021  9:10 PM      START taking these medications    Details   butalbital-acetaminophen-caffeine (FIORICET, ESGIC) -40 MG per tablet Take 1 tablet by mouth every 4 hours as needed for Headaches, Disp-30 tablet, R-0Normal             ED Provider Disposition Time  DISPOSITION Decision To Discharge 08/09/2021 08:59:47 PM      Appropriate personal protective equipment was worn during the patient's evaluation. These included surgical, eye protection, surgical mask or in 95 respirator and gloves. The patient was also placed in a surgical mask for the prevention of possible spread of respiratory viral illnesses. The Patient was instructed to read the package inserts with any medication that was prescribed. Major potential reactions and medication interactions were discussed. The Patient understands that there are numerous possible adverse reactions not covered. The patient was also instructed to arrange follow-up with his or her primary care provider for review of any pending labwork or incidental findings on any radiology results that were obtained. All efforts were made to discuss any incidental findings that require further monitoring. Controlled Substances Monitoring:     No flowsheet data found.     (Please note that portions of this note were completed with a voice recognition program.  Efforts were made to edit the dictations but occasionally words are mis-transcribed.)    Yelena Al MD (electronically signed)  Attending Emergency Physician           Yelena Al MD  08/12/21 [id number]

## 2021-08-10 NOTE — CARE COORDINATION
Pt identified as potential readmission. Last admission 8/6/2021 - 8/6/2021 for Headache    Pt here today for Headache    No Acute Findings. Readmission was avoided and pt was able to be d/c'd home.

## 2021-08-11 LAB
CULTURE: NORMAL
CULTURE: NORMAL
Lab: NORMAL
Lab: NORMAL
SPECIMEN: NORMAL
SPECIMEN: NORMAL

## 2021-08-12 ASSESSMENT — ENCOUNTER SYMPTOMS
ABDOMINAL PAIN: 0
NAUSEA: 0
RHINORRHEA: 0
EYE PAIN: 0
SHORTNESS OF BREATH: 0
SORE THROAT: 0
VOMITING: 0
BACK PAIN: 0
COUGH: 0
EYE DISCHARGE: 0

## 2021-08-13 LAB
CULTURE: ABNORMAL
GRAM SMEAR: ABNORMAL
Lab: ABNORMAL
SPECIMEN: ABNORMAL
